# Patient Record
Sex: FEMALE | Race: WHITE | NOT HISPANIC OR LATINO | Employment: UNEMPLOYED | URBAN - METROPOLITAN AREA
[De-identification: names, ages, dates, MRNs, and addresses within clinical notes are randomized per-mention and may not be internally consistent; named-entity substitution may affect disease eponyms.]

---

## 2017-01-12 ENCOUNTER — PREPPED CHART (OUTPATIENT)
Dept: URBAN - METROPOLITAN AREA CLINIC 39 | Facility: CLINIC | Age: 77
End: 2017-01-12

## 2018-03-30 ENCOUNTER — ESTABLISHED COMPREHENSIVE EXAM (OUTPATIENT)
Dept: URBAN - METROPOLITAN AREA CLINIC 39 | Facility: CLINIC | Age: 78
End: 2018-03-30

## 2018-03-30 DIAGNOSIS — H43.813: ICD-10-CM

## 2018-03-30 DIAGNOSIS — H40.013: ICD-10-CM

## 2018-03-30 DIAGNOSIS — H04.123: ICD-10-CM

## 2018-03-30 DIAGNOSIS — H26.493: ICD-10-CM

## 2018-03-30 PROCEDURE — G8428 CUR MEDS NOT DOCUMENT: HCPCS

## 2018-03-30 PROCEDURE — 92014 COMPRE OPH EXAM EST PT 1/>: CPT

## 2018-03-30 PROCEDURE — 92133 CPTRZD OPH DX IMG PST SGM ON: CPT

## 2018-03-30 PROCEDURE — 1036F TOBACCO NON-USER: CPT

## 2018-03-30 PROCEDURE — 92015 DETERMINE REFRACTIVE STATE: CPT

## 2018-03-30 ASSESSMENT — VISUAL ACUITY
OS_SC: 20/40
OS_SC: J2
OD_SC: J4
OD_SC: 20/70

## 2018-03-30 ASSESSMENT — TONOMETRY
OD_IOP_MMHG: 14
OS_IOP_MMHG: 14

## 2018-04-10 ENCOUNTER — SURGERY/PROCEDURE (OUTPATIENT)
Dept: URBAN - METROPOLITAN AREA SURGERY 14 | Facility: SURGERY | Age: 78
End: 2018-04-10

## 2018-04-10 ENCOUNTER — CONSULT (OUTPATIENT)
Dept: URBAN - METROPOLITAN AREA CLINIC 39 | Facility: CLINIC | Age: 78
End: 2018-04-10

## 2018-04-10 VITALS
DIASTOLIC BLOOD PRESSURE: 95 MMHG | RESPIRATION RATE: 17 BRPM | HEIGHT: 55 IN | SYSTOLIC BLOOD PRESSURE: 161 MMHG | HEART RATE: 74 BPM

## 2018-04-10 DIAGNOSIS — H26.491: ICD-10-CM

## 2018-04-10 DIAGNOSIS — H26.493: ICD-10-CM

## 2018-04-10 PROCEDURE — G8756 NO BP MEASURE DOC: HCPCS

## 2018-04-10 PROCEDURE — 4040F PNEUMOC VAC/ADMIN/RCVD: CPT

## 2018-04-10 PROCEDURE — 66821 AFTER CATARACT LASER SURGERY: CPT

## 2018-04-10 PROCEDURE — 99214 OFFICE O/P EST MOD 30 MIN: CPT

## 2018-04-10 PROCEDURE — G8482 FLU IMMUNIZE ORDER/ADMIN: HCPCS

## 2018-04-10 PROCEDURE — 1036F TOBACCO NON-USER: CPT

## 2018-04-10 PROCEDURE — G8428 CUR MEDS NOT DOCUMENT: HCPCS

## 2018-04-10 ASSESSMENT — VISUAL ACUITY
OS_SC: 20/40-2
OD_SC: J5
OD_SC: 20/70-1
OS_SC: J3
OS_BAT: 20/70
OD_BAT: 20/200

## 2018-04-10 ASSESSMENT — TONOMETRY
OS_IOP_MMHG: 15
OD_IOP_MMHG: 15

## 2018-04-12 ENCOUNTER — POST-OP (OUTPATIENT)
Dept: URBAN - METROPOLITAN AREA CLINIC 39 | Facility: CLINIC | Age: 78
End: 2018-04-12

## 2018-04-12 DIAGNOSIS — Z98.890: ICD-10-CM

## 2018-04-12 PROCEDURE — 99024 POSTOP FOLLOW-UP VISIT: CPT

## 2018-04-12 ASSESSMENT — VISUAL ACUITY
OS_SC: 20/40-1
OD_SC: J3
OD_SC: 20/50+2
OS_SC: J1
OU_SC: 20/30-2
OU_SC: J1
OD_PH: 20/40+2

## 2018-04-12 ASSESSMENT — TONOMETRY
OD_IOP_MMHG: 12
OS_IOP_MMHG: 11

## 2018-04-24 ENCOUNTER — SURGERY/PROCEDURE (OUTPATIENT)
Dept: URBAN - METROPOLITAN AREA SURGERY 14 | Facility: SURGERY | Age: 78
End: 2018-04-24

## 2018-04-24 ENCOUNTER — ESTABLISHED PATIENT (OUTPATIENT)
Dept: URBAN - METROPOLITAN AREA CLINIC 39 | Facility: CLINIC | Age: 78
End: 2018-04-24

## 2018-04-24 DIAGNOSIS — H26.492: ICD-10-CM

## 2018-04-24 DIAGNOSIS — Z98.890: ICD-10-CM

## 2018-04-24 PROCEDURE — 66821 AFTER CATARACT LASER SURGERY: CPT

## 2018-04-24 PROCEDURE — 4040F PNEUMOC VAC/ADMIN/RCVD: CPT

## 2018-04-24 PROCEDURE — 1036F TOBACCO NON-USER: CPT

## 2018-04-24 PROCEDURE — G8428 CUR MEDS NOT DOCUMENT: HCPCS

## 2018-04-24 PROCEDURE — G8756 NO BP MEASURE DOC: HCPCS

## 2018-04-24 PROCEDURE — 99213 OFFICE O/P EST LOW 20 MIN: CPT

## 2018-04-24 PROCEDURE — G8482 FLU IMMUNIZE ORDER/ADMIN: HCPCS

## 2018-04-24 ASSESSMENT — VISUAL ACUITY
OS_BAT: 20/70
OS_SC: J1
OD_SC: 20/40
OS_SC: 20/25
OD_SC: J3

## 2018-04-26 ENCOUNTER — POST-OP (OUTPATIENT)
Dept: URBAN - METROPOLITAN AREA CLINIC 39 | Facility: CLINIC | Age: 78
End: 2018-04-26

## 2018-04-26 DIAGNOSIS — Z98.890: ICD-10-CM

## 2018-04-26 PROCEDURE — 99024 POSTOP FOLLOW-UP VISIT: CPT

## 2018-04-26 ASSESSMENT — VISUAL ACUITY
OU_SC: 20/25-2
OS_SC: J1
OD_SC: J3
OS_SC: 20/30-1
OD_SC: 20/80
OU_SC: J1

## 2018-04-26 ASSESSMENT — TONOMETRY
OS_IOP_MMHG: 12
OD_IOP_MMHG: 13

## 2019-07-25 ENCOUNTER — APPOINTMENT (RX ONLY)
Dept: URBAN - METROPOLITAN AREA CLINIC 134 | Facility: CLINIC | Age: 79
Setting detail: DERMATOLOGY
End: 2019-07-25

## 2019-07-25 DIAGNOSIS — D18.0 HEMANGIOMA: ICD-10-CM

## 2019-07-25 DIAGNOSIS — L72.0 EPIDERMAL CYST: ICD-10-CM

## 2019-07-25 DIAGNOSIS — L82.0 INFLAMED SEBORRHEIC KERATOSIS: ICD-10-CM

## 2019-07-25 DIAGNOSIS — L82.1 OTHER SEBORRHEIC KERATOSIS: ICD-10-CM

## 2019-07-25 DIAGNOSIS — L57.8 OTHER SKIN CHANGES DUE TO CHRONIC EXPOSURE TO NONIONIZING RADIATION: ICD-10-CM

## 2019-07-25 DIAGNOSIS — D22 MELANOCYTIC NEVI: ICD-10-CM

## 2019-07-25 PROBLEM — I10 ESSENTIAL (PRIMARY) HYPERTENSION: Status: ACTIVE | Noted: 2019-07-25

## 2019-07-25 PROBLEM — E78.5 HYPERLIPIDEMIA, UNSPECIFIED: Status: ACTIVE | Noted: 2019-07-25

## 2019-07-25 PROBLEM — D22.5 MELANOCYTIC NEVI OF TRUNK: Status: ACTIVE | Noted: 2019-07-25

## 2019-07-25 PROBLEM — D48.5 NEOPLASM OF UNCERTAIN BEHAVIOR OF SKIN: Status: ACTIVE | Noted: 2019-07-25

## 2019-07-25 PROBLEM — D18.01 HEMANGIOMA OF SKIN AND SUBCUTANEOUS TISSUE: Status: ACTIVE | Noted: 2019-07-25

## 2019-07-25 PROBLEM — E03.9 HYPOTHYROIDISM, UNSPECIFIED: Status: ACTIVE | Noted: 2019-07-25

## 2019-07-25 PROCEDURE — 17110 DESTRUCTION B9 LES UP TO 14: CPT

## 2019-07-25 PROCEDURE — ? BIOPSY BY SHAVE METHOD

## 2019-07-25 PROCEDURE — ? BENIGN DESTRUCTION

## 2019-07-25 PROCEDURE — 99202 OFFICE O/P NEW SF 15 MIN: CPT | Mod: 25

## 2019-07-25 PROCEDURE — 11102 TANGNTL BX SKIN SINGLE LES: CPT | Mod: 59

## 2019-07-25 PROCEDURE — ? COUNSELING

## 2019-07-25 ASSESSMENT — LOCATION SIMPLE DESCRIPTION DERM
LOCATION SIMPLE: NOSE
LOCATION SIMPLE: LEFT CHEEK
LOCATION SIMPLE: LEFT BREAST
LOCATION SIMPLE: RIGHT FOREHEAD
LOCATION SIMPLE: ABDOMEN
LOCATION SIMPLE: GLABELLA

## 2019-07-25 ASSESSMENT — LOCATION DETAILED DESCRIPTION DERM
LOCATION DETAILED: LEFT INFERIOR CENTRAL MALAR CHEEK
LOCATION DETAILED: LEFT MEDIAL BREAST 11-12:00 REGION
LOCATION DETAILED: RIGHT INFERIOR MEDIAL FOREHEAD
LOCATION DETAILED: PERIUMBILICAL SKIN
LOCATION DETAILED: LEFT LATERAL ABDOMEN
LOCATION DETAILED: EPIGASTRIC SKIN
LOCATION DETAILED: NASAL ROOT
LOCATION DETAILED: GLABELLA

## 2019-07-25 ASSESSMENT — LOCATION ZONE DERM
LOCATION ZONE: NOSE
LOCATION ZONE: TRUNK
LOCATION ZONE: FACE

## 2019-07-25 NOTE — PROCEDURE: MIPS QUALITY
Quality 130: Documentation Of Current Medications In The Medical Record: Current Medications Documented
Additional Notes: Pn2
Quality 474: Zoster Vaccination Status: Shingrix Vaccination Administered or Previously Received
Detail Level: Detailed
Quality 131: Pain Assessment And Follow-Up: Pain assessment using a standardized tool is documented as negative, no follow-up plan required

## 2019-07-25 NOTE — PROCEDURE: BENIGN DESTRUCTION
Post-Care Instructions: I reviewed with the patient in detail post-care instructions. Patient is to wear sunprotection, and avoid picking at any of the treated lesions. Pt may apply Vaseline to crusted or scabbing areas.
Include Z78.9 (Other Specified Conditions Influencing Health Status) As An Associated Diagnosis?: No
Medical Necessity Clause: This procedure was medically necessary because the lesions that were treated were:
Detail Level: Detailed
Treatment Number (Will Not Render If 0): 0
Medical Necessity Information: It is in your best interest to select a reason for this procedure from the list below. All of these items fulfill various CMS LCD requirements except the new and changing color options.
Consent: The patient's consent was obtained including but not limited to risks of crusting, scabbing, blistering, scarring, darker or lighter pigmentary change, recurrence, incomplete removal and infection.
Anesthesia Volume In Cc: 0.5

## 2019-07-25 NOTE — PROCEDURE: BIOPSY BY SHAVE METHOD
Anesthesia Type: 1% lidocaine with epinephrine
Biopsy Type: H and E
Silver Nitrate Text: The wound bed was treated with silver nitrate after the biopsy was performed.
Additional Anesthesia Volume In Cc (Will Not Render If 0): 0
Detail Level: Detailed
Consent: Written consent was obtained and risks were reviewed including but not limited to scarring, infection, bleeding, scabbing, incomplete removal, nerve damage and allergy to anesthesia.
Destruction After The Procedure: No
Electrodesiccation And Curettage Text: The wound bed was treated with electrodesiccation and curettage after the biopsy was performed.
Notification Instructions: Patient will be notified of biopsy results. However, patient instructed to call the office if not contacted within 2 weeks.
Was A Bandage Applied: Yes
Wound Care: Petrolatum
Electrodesiccation Text: The wound bed was treated with electrodesiccation after the biopsy was performed.
Anesthesia Volume In Cc (Will Not Render If 0): 0.5
Biopsy Method: Dermablade
Lab Facility: [facility]
Post-Care Instructions: I reviewed with the patient in detail post-care instructions. Patient is to keep the biopsy site dry overnight, and then apply bacitracin twice daily until healed. Patient may apply hydrogen peroxide soaks to remove any crusting.
Billing Type: [employer]
Type Of Destruction Used: Curettage
Lab: [facility]
Size Of Lesion In Cm: 1.2
Dressing: bandage
Cryotherapy Text: The wound bed was treated with cryotherapy after the biopsy was performed.
Depth Of Biopsy: dermis
Hemostasis: Drysol
Curettage Text: The wound bed was treated with curettage after the biopsy was performed.

## 2019-10-21 ENCOUNTER — ESTABLISHED COMPREHENSIVE EXAM (OUTPATIENT)
Dept: URBAN - METROPOLITAN AREA CLINIC 39 | Facility: CLINIC | Age: 79
End: 2019-10-21

## 2019-10-21 DIAGNOSIS — H04.123: ICD-10-CM

## 2019-10-21 DIAGNOSIS — H43.813: ICD-10-CM

## 2019-10-21 DIAGNOSIS — H40.013: ICD-10-CM

## 2019-10-21 DIAGNOSIS — H53.001: ICD-10-CM

## 2019-10-21 PROCEDURE — 92014 COMPRE OPH EXAM EST PT 1/>: CPT

## 2019-10-21 PROCEDURE — 92015 DETERMINE REFRACTIVE STATE: CPT

## 2019-10-21 ASSESSMENT — TONOMETRY
OD_IOP_MMHG: 17
OS_IOP_MMHG: 16

## 2019-10-21 ASSESSMENT — VISUAL ACUITY
OD_SC: J4
OS_SC: 20/40-2
OS_SC: J2
OD_SC: 20/50-2

## 2021-02-01 ENCOUNTER — ESTABLISHED COMPREHENSIVE EXAM (OUTPATIENT)
Dept: URBAN - METROPOLITAN AREA CLINIC 39 | Facility: CLINIC | Age: 81
End: 2021-02-01

## 2021-02-01 DIAGNOSIS — H53.10: ICD-10-CM

## 2021-02-01 DIAGNOSIS — Z96.1: ICD-10-CM

## 2021-02-01 DIAGNOSIS — H40.013: ICD-10-CM

## 2021-02-01 DIAGNOSIS — H50.21: ICD-10-CM

## 2021-02-01 DIAGNOSIS — H43.813: ICD-10-CM

## 2021-02-01 DIAGNOSIS — H04.123: ICD-10-CM

## 2021-02-01 DIAGNOSIS — H50.00: ICD-10-CM

## 2021-02-01 DIAGNOSIS — H53.031: ICD-10-CM

## 2021-02-01 PROCEDURE — 92014 COMPRE OPH EXAM EST PT 1/>: CPT

## 2021-02-01 PROCEDURE — 92015 DETERMINE REFRACTIVE STATE: CPT

## 2021-02-01 PROCEDURE — 92133 CPTRZD OPH DX IMG PST SGM ON: CPT

## 2021-02-01 ASSESSMENT — VISUAL ACUITY
OS_SC: J2
OS_SC: 20/40+1
OD_SC: J12
OD_SC: 20/60+2

## 2021-02-01 ASSESSMENT — TONOMETRY
OS_IOP_MMHG: 14
OD_IOP_MMHG: 16

## 2021-06-07 ENCOUNTER — ESTABLISHED COMPREHENSIVE EXAM (OUTPATIENT)
Dept: URBAN - METROPOLITAN AREA CLINIC 46 | Facility: CLINIC | Age: 81
End: 2021-06-07

## 2021-06-07 DIAGNOSIS — H50.21: ICD-10-CM

## 2021-06-07 DIAGNOSIS — H53.031: ICD-10-CM

## 2021-06-07 DIAGNOSIS — H53.10: ICD-10-CM

## 2021-06-07 DIAGNOSIS — H43.813: ICD-10-CM

## 2021-06-07 DIAGNOSIS — H40.013: ICD-10-CM

## 2021-06-07 DIAGNOSIS — H50.00: ICD-10-CM

## 2021-06-07 DIAGNOSIS — H04.123: ICD-10-CM

## 2021-06-07 DIAGNOSIS — H18.233: ICD-10-CM

## 2021-06-07 DIAGNOSIS — Z96.1: ICD-10-CM

## 2021-06-07 PROCEDURE — 92012 INTRM OPH EXAM EST PATIENT: CPT

## 2021-06-07 RX ORDER — PREDNISOLONE ACETATE 10 MG/ML: 1 SUSPENSION/ DROPS OPHTHALMIC TWICE A DAY

## 2021-06-07 ASSESSMENT — VISUAL ACUITY
OS_PH: 20/30-1
OD_SC: 20/40-1
OS_SC: 20/40-2

## 2021-06-07 ASSESSMENT — TONOMETRY
OD_IOP_MMHG: 14
OS_IOP_MMHG: 13

## 2021-06-23 ENCOUNTER — FOLLOW UP (OUTPATIENT)
Dept: URBAN - METROPOLITAN AREA CLINIC 46 | Facility: CLINIC | Age: 81
End: 2021-06-23

## 2021-06-23 DIAGNOSIS — H04.123: ICD-10-CM

## 2021-06-23 DIAGNOSIS — H18.233: ICD-10-CM

## 2021-06-23 DIAGNOSIS — H43.813: ICD-10-CM

## 2021-06-23 DIAGNOSIS — H53.10: ICD-10-CM

## 2021-06-23 PROCEDURE — 92134 CPTRZ OPH DX IMG PST SGM RTA: CPT

## 2021-06-23 PROCEDURE — 92012 INTRM OPH EXAM EST PATIENT: CPT

## 2021-06-23 ASSESSMENT — VISUAL ACUITY
OU_SC: 20/40
OD_PH: 20/40
OS_SC: 20/40
OD_SC: 20/50

## 2021-06-23 ASSESSMENT — TONOMETRY
OD_IOP_MMHG: 14
OS_IOP_MMHG: 14

## 2022-02-16 ENCOUNTER — COMPREHENSIVE EXAM (OUTPATIENT)
Dept: URBAN - METROPOLITAN AREA CLINIC 46 | Facility: CLINIC | Age: 82
End: 2022-02-16

## 2022-02-16 DIAGNOSIS — H43.813: ICD-10-CM

## 2022-02-16 DIAGNOSIS — Z98.890: ICD-10-CM

## 2022-02-16 DIAGNOSIS — H04.123: ICD-10-CM

## 2022-02-16 DIAGNOSIS — H53.10: ICD-10-CM

## 2022-02-16 DIAGNOSIS — H18.233: ICD-10-CM

## 2022-02-16 DIAGNOSIS — D31.31: ICD-10-CM

## 2022-02-16 PROCEDURE — 92012 INTRM OPH EXAM EST PATIENT: CPT

## 2022-02-16 ASSESSMENT — VISUAL ACUITY
OS_SC: J4
OS_PH: 20/30-1
OD_PH: 20/40+1
OU_SC: J2
OS_SC: 20/40-1
OD_SC: J5
OD_SC: 20/60-2
OU_SC: 20/40+1

## 2022-02-16 ASSESSMENT — TONOMETRY
OS_IOP_MMHG: 17
OD_IOP_MMHG: 15

## 2023-08-24 ENCOUNTER — FOLLOW UP (OUTPATIENT)
Dept: URBAN - METROPOLITAN AREA CLINIC 46 | Facility: CLINIC | Age: 83
End: 2023-08-24

## 2023-08-24 DIAGNOSIS — Z98.890: ICD-10-CM

## 2023-08-24 DIAGNOSIS — Z96.1: ICD-10-CM

## 2023-08-24 DIAGNOSIS — H35.033: ICD-10-CM

## 2023-08-24 DIAGNOSIS — H01.026: ICD-10-CM

## 2023-08-24 DIAGNOSIS — H04.123: ICD-10-CM

## 2023-08-24 DIAGNOSIS — H43.813: ICD-10-CM

## 2023-08-24 DIAGNOSIS — H01.023: ICD-10-CM

## 2023-08-24 DIAGNOSIS — D31.31: ICD-10-CM

## 2023-08-24 DIAGNOSIS — H40.013: ICD-10-CM

## 2023-08-24 PROCEDURE — A4262 TEMPORARY TEAR DUCT PLUG: HCPCS

## 2023-08-24 PROCEDURE — 68761Q PUNCTAL PLUG/QUINTESS DISSOLVABLE PLUG/EACH

## 2023-08-24 PROCEDURE — 92012 INTRM OPH EXAM EST PATIENT: CPT

## 2023-08-24 ASSESSMENT — VISUAL ACUITY
OU_SC: 20/25
OS_SC: 20/30-1
OD_SC: J4
OS_SC: J2
OD_PH: 20/25
OD_SC: 20/30-1
OU_SC: J1

## 2023-08-24 ASSESSMENT — TONOMETRY
OD_IOP_MMHG: 15
OS_IOP_MMHG: 15

## 2024-02-28 ENCOUNTER — COMPREHENSIVE EXAM (OUTPATIENT)
Dept: URBAN - METROPOLITAN AREA CLINIC 46 | Facility: CLINIC | Age: 84
End: 2024-02-28

## 2024-02-28 DIAGNOSIS — H43.813: ICD-10-CM

## 2024-02-28 DIAGNOSIS — H35.033: ICD-10-CM

## 2024-02-28 DIAGNOSIS — H04.123: ICD-10-CM

## 2024-02-28 DIAGNOSIS — H40.013: ICD-10-CM

## 2024-02-28 DIAGNOSIS — Z98.890: ICD-10-CM

## 2024-02-28 DIAGNOSIS — D31.31: ICD-10-CM

## 2024-02-28 DIAGNOSIS — Z96.1: ICD-10-CM

## 2024-02-28 PROCEDURE — 68761Q PUNCTAL PLUG/QUINTESS DISSOLVABLE PLUG/EACH

## 2024-02-28 PROCEDURE — A4262 TEMPORARY TEAR DUCT PLUG: HCPCS

## 2024-02-28 PROCEDURE — 92133 CPTRZD OPH DX IMG PST SGM ON: CPT

## 2024-02-28 PROCEDURE — 92014 COMPRE OPH EXAM EST PT 1/>: CPT

## 2024-02-28 ASSESSMENT — VISUAL ACUITY
OD_SC: 20/60+2
OU_SC: 20/40-2
OD_PH: 20/30-2
OD_SC: J5
OU_SC: J3
OS_SC: J4
OS_SC: 20/40-2
OS_PH: 20/30-2

## 2024-02-28 ASSESSMENT — TONOMETRY
OD_IOP_MMHG: 17
OS_IOP_MMHG: 16

## 2024-08-29 ENCOUNTER — FOLLOW UP (OUTPATIENT)
Dept: URBAN - METROPOLITAN AREA CLINIC 46 | Facility: CLINIC | Age: 84
End: 2024-08-29

## 2024-08-29 DIAGNOSIS — H43.813: ICD-10-CM

## 2024-08-29 DIAGNOSIS — Z96.1: ICD-10-CM

## 2024-08-29 DIAGNOSIS — H01.024: ICD-10-CM

## 2024-08-29 DIAGNOSIS — H04.123: ICD-10-CM

## 2024-08-29 DIAGNOSIS — H40.013: ICD-10-CM

## 2024-08-29 DIAGNOSIS — D31.31: ICD-10-CM

## 2024-08-29 DIAGNOSIS — H35.033: ICD-10-CM

## 2024-08-29 DIAGNOSIS — H01.021: ICD-10-CM

## 2024-08-29 PROCEDURE — 68761Q PUNCTAL PLUG/QUINTESS DISSOLVABLE PLUG/EACH: Mod: E4,E2

## 2024-08-29 PROCEDURE — 92134 CPTRZ OPH DX IMG PST SGM RTA: CPT

## 2024-08-29 PROCEDURE — 92012 INTRM OPH EXAM EST PATIENT: CPT | Mod: 25

## 2024-08-29 PROCEDURE — A4262 TEMPORARY TEAR DUCT PLUG: HCPCS | Mod: E4,E2

## 2024-08-29 ASSESSMENT — TONOMETRY
OD_IOP_MMHG: 15
OS_IOP_MMHG: 16

## 2024-08-29 ASSESSMENT — VISUAL ACUITY
OD_SC: 20/50-1
OU_SC: J2
OS_PH: 20/30-1
OD_SC: J2
OU_SC: 20/50
OS_SC: 20/50
OS_SC: J2
OD_PH: 20/40-1